# Patient Record
Sex: FEMALE | Race: WHITE | Employment: OTHER | URBAN - METROPOLITAN AREA
[De-identification: names, ages, dates, MRNs, and addresses within clinical notes are randomized per-mention and may not be internally consistent; named-entity substitution may affect disease eponyms.]

---

## 2018-03-06 PROBLEM — R07.9 CHEST PAIN: Status: ACTIVE | Noted: 2018-03-06

## 2018-03-06 PROBLEM — E78.2 MIXED HYPERLIPIDEMIA: Chronic | Status: ACTIVE | Noted: 2018-03-06

## 2018-03-23 ENCOUNTER — OFFICE VISIT (OUTPATIENT)
Dept: CARDIOLOGY CLINIC | Age: 68
End: 2018-03-23
Payer: COMMERCIAL

## 2018-03-23 VITALS
BODY MASS INDEX: 20.89 KG/M2 | SYSTOLIC BLOOD PRESSURE: 112 MMHG | RESPIRATION RATE: 18 BRPM | HEIGHT: 67 IN | DIASTOLIC BLOOD PRESSURE: 64 MMHG | WEIGHT: 133.1 LBS | HEART RATE: 74 BPM

## 2018-03-23 DIAGNOSIS — R55 NEAR SYNCOPE: ICD-10-CM

## 2018-03-23 DIAGNOSIS — R07.9 CHEST PAIN, UNSPECIFIED TYPE: Primary | ICD-10-CM

## 2018-03-23 DIAGNOSIS — R00.2 PALPITATIONS: ICD-10-CM

## 2018-03-23 PROCEDURE — 93000 ELECTROCARDIOGRAM COMPLETE: CPT | Performed by: INTERNAL MEDICINE

## 2018-03-23 PROCEDURE — 99204 OFFICE O/P NEW MOD 45 MIN: CPT | Performed by: INTERNAL MEDICINE

## 2018-03-26 NOTE — PROGRESS NOTES
weeks but we may see her sooner if the monitor shows us anything concerning. I thank you Dr. Stan Carias for asking our advice regarding her care.         DAB/aed  [id number]

## 2018-04-05 ENCOUNTER — HOSPITAL ENCOUNTER (OUTPATIENT)
Dept: CARDIOLOGY | Age: 68
Discharge: HOME | End: 2018-04-05
Payer: COMMERCIAL

## 2018-04-05 DIAGNOSIS — R00.2 PALPITATIONS: ICD-10-CM

## 2018-04-05 DIAGNOSIS — R55 NEAR SYNCOPE: ICD-10-CM

## 2018-04-05 DIAGNOSIS — R07.9 CHEST PAIN, UNSPECIFIED TYPE: ICD-10-CM

## 2018-04-05 LAB
LV EF: 60 %
LVEF MODALITY: NORMAL

## 2018-04-05 PROCEDURE — 93306 TTE W/DOPPLER COMPLETE: CPT

## 2018-04-09 ENCOUNTER — TELEPHONE (OUTPATIENT)
Dept: CARDIOLOGY CLINIC | Age: 68
End: 2018-04-09

## 2018-05-02 ENCOUNTER — TELEPHONE (OUTPATIENT)
Dept: CARDIOLOGY CLINIC | Age: 68
End: 2018-05-02

## 2018-05-02 DIAGNOSIS — R07.9 CHEST PAIN, UNSPECIFIED TYPE: ICD-10-CM

## 2018-05-02 DIAGNOSIS — R55 NEAR SYNCOPE: ICD-10-CM

## 2018-05-02 DIAGNOSIS — R00.2 PALPITATIONS: ICD-10-CM

## 2018-07-12 ENCOUNTER — OFFICE VISIT (OUTPATIENT)
Dept: CARDIOLOGY CLINIC | Age: 68
End: 2018-07-12
Payer: COMMERCIAL

## 2018-07-12 VITALS
HEART RATE: 65 BPM | DIASTOLIC BLOOD PRESSURE: 60 MMHG | WEIGHT: 134.3 LBS | BODY MASS INDEX: 21.08 KG/M2 | SYSTOLIC BLOOD PRESSURE: 110 MMHG | HEIGHT: 67 IN | RESPIRATION RATE: 16 BRPM

## 2018-07-12 DIAGNOSIS — R53.83 FATIGUE, UNSPECIFIED TYPE: Primary | ICD-10-CM

## 2018-07-12 DIAGNOSIS — R00.2 PALPITATIONS: ICD-10-CM

## 2018-07-12 PROCEDURE — 93000 ELECTROCARDIOGRAM COMPLETE: CPT | Performed by: INTERNAL MEDICINE

## 2018-07-12 PROCEDURE — 99213 OFFICE O/P EST LOW 20 MIN: CPT | Performed by: INTERNAL MEDICINE

## 2018-07-12 NOTE — PROGRESS NOTES
[facility]:  Anna Aguilera M.D., [facility]. Hamlet Chambers M.D., Sawyer Jacobson M.D. Desiree Moctezuma   1950  Carrie Krishnan MD    Toribio Velásquez returned to our [facility] office today, 07/12/2018, for follow up of her episode of near syncope and chest discomfort. The event occurred on 03/06/2018. She was hospitalized and her monitor did not show any significant abnormalities. She ruled out for a myocardial infarction and an exercise stress test was unremarkable. As an outpatient, she subsequently underwent a 30 day event recorder, which showed sinus rhythm with an average heart rate of 72. Heart rate in sinus rhythm varied from 52 to 120 bpm.  An echocardiogram was also done and was normal.      The patient has no evidence for structural heart disease and a 30 day event recorder did not show any significant arrhythmias. She states that rarely she will now get palpitations, which last seconds and resolve spontaneously. These typically occur when she is relaxed in bed. Otherwise, she has not had any further near syncopal episodes or chest discomfort. Her medical history includes:   1. Tubal ligation in 1986.  2. Cyst removed from her left breast in 1978 (benign). 3. History of cataract resection. 4. Osteoporosis. 5. Family history: Grandmother and sister had heart problems,  her mother has had TIA's but no one had problems at a young age. 6. Hospitalization for chest discomfort and near syncope, 03/06/2018, myocardial infarction ruled out. 7. Exercise myocardial perfusion study, 03/07/2018, 7.1 METS, [place] protocol. No chest pain or EKG changes. No exercise induced arrhythmias. Ejection fraction 87%. Normal perfusion. 8. Echocardiogram, 04/05/2018, normal recording. Ejection fraction 55-60%. 9. 30 day event recorder from 03/30-04/28/18 showed sinus rhythm with an average heart rate of 72. Heart rate in sinus varied from . No arrhythmias recorded. eating habits, consistent activities, etc.  We will remain available to see her if needed, but I did not make any routine follow up appointment with her today. Her medications will continue to be:   CALCIUM-MAG-VIT C-VIT D PO Take 1,200 mg by mouth daily   Fexofenadine HCl (ALLEGRA PO) Take by mouth daily   Montelukast Sodium (SINGULAIR PO) Take by mouth daily   aspirin 81 MG tablet Take 81 mg by mouth nightly   calcium carbonate (OSCAL) 500 MG TABS tablet Take 1,200 mg by mouth daily    omeprazole (PRILOSEC) 40 MG delayed release capsule Take 40 mg by mouth daily   Probiotic Product (PROBIOTIC ADVANCED) CAPS Take by mouth daily   Cholecalciferol (VITAMIN D3) 2000 units CAPS Take 1 capsule by mouth daily Ld 02/22. alendronate (FOSAMAX) 70 MG tablet Take 70 mg by mouth every 7 days sundays   acetaminophen 650 MG TABS Take 650 mg by mouth every 4 hours as needed. I thank Dr. Michel Olivarez for asking our advice regarding her care.       DAB/tlr

## 2022-09-15 ENCOUNTER — HOSPITAL ENCOUNTER (OUTPATIENT)
Age: 72
Discharge: HOME | End: 2022-09-17
Payer: MEDICARE

## 2022-09-15 ENCOUNTER — HOSPITAL ENCOUNTER (OUTPATIENT)
Dept: CT IMAGING | Age: 72
Discharge: HOME | End: 2022-09-17
Payer: MEDICARE

## 2022-09-15 DIAGNOSIS — R51.9 FACIAL PAIN: ICD-10-CM

## 2022-09-15 DIAGNOSIS — G11.8 EPISODIC ATAXIA WITH SLURRED SPEECH (HCC): ICD-10-CM

## 2022-09-15 DIAGNOSIS — R47.81 EPISODIC ATAXIA WITH SLURRED SPEECH (HCC): ICD-10-CM

## 2022-09-15 PROCEDURE — 70450 CT HEAD/BRAIN W/O DYE: CPT

## 2022-10-23 LAB
ORGANISM: ABNORMAL
URINE CULTURE, ROUTINE: ABNORMAL